# Patient Record
Sex: FEMALE | Race: WHITE | Employment: UNEMPLOYED
[De-identification: names, ages, dates, MRNs, and addresses within clinical notes are randomized per-mention and may not be internally consistent; named-entity substitution may affect disease eponyms.]

---

## 2017-11-12 ENCOUNTER — HEALTH MAINTENANCE LETTER (OUTPATIENT)
Age: 27
End: 2017-11-12

## 2022-01-01 NOTE — PROGRESS NOTES
"History of Present Illness - Abby Harrison is a 31 year old female here to see me for the first time as a patient, but I have seen her previously with her daughter who is a patient of mine.      She presents due to a chronically inflamed nodule of the ear lobe.  She thinks that it started after wearing a new pair of earrings that casued a lot of irritation.  And even after no longer using those earrings, a chronically irritated and sometimes draining nodule has formed in the ear lobe.    Past medical history -   Patient Active Problem List   Diagnosis     CARDIOVASCULAR SCREENING; LDL GOAL LESS THAN 160       /72   Pulse 82   Ht 1.727 m (5' 8\")   Wt 105.1 kg (231 lb 9.6 oz)   SpO2 99%   BMI 35.21 kg/m      General - The patient is well nourished and well developed, and appears to have good nutritional status.  Alert and oriented to person and place, answers questions and cooperates with examination appropriately.   Head and Face - Normocephalic and atraumatic, with no gross asymmetry noted of the contour of the facial features.  The facial nerve is intact, with strong symmetric movements.  Eyes - Extraocular movements intact, and the pupils were reactive to light.  Sclera were not icteric or injected, conjunctiva were pink and moist.  Mouth - Examination of the oral cavity shows pink, healthy, moist mucosa.  No lesions or ulceration noted.  The dentition are in good repair.  The tongue is mobile and midline.  Ear - The left external ear lobe had a very firm subdermal nodule in the middle of the lobule.  It seemed consistent with a cyst, but it was large and followed the tract of the piercing, such that it was full thickness of the lobule, front to back.    PROCEDURE  After informed consent, I injected the area around the skin lesion with 1cc of 1% Lidocaine with 1:100,000 epinephrine.  After sufficient local anesthesia was confirmed, I used an 11 blade to take a wedge cut through the lobule, and removed the " entire cutaneous and subcutaneous nodule with the wedge of tissue.  Total length 1.8 cm.      I then placed a single 4-0 Vicryl in the subdermal fibrofatty tissue to re approximate the circular wound. I then used 4-0 fast gut to re approximate the skin on the front and back of the lobule.    A/P - Abby Harrison  (H61.92) Skin lesion of left ear  (primary encounter diagnosis)    The patient was educated on wound care and signs of infection.  Follow-up in one month for a wound check.  We might need to revise a small dog ear at the edge of the lobule.

## 2022-01-06 ENCOUNTER — OFFICE VISIT (OUTPATIENT)
Dept: OTOLARYNGOLOGY | Facility: CLINIC | Age: 32
End: 2022-01-06
Payer: COMMERCIAL

## 2022-01-06 VITALS
HEART RATE: 82 BPM | OXYGEN SATURATION: 99 % | HEIGHT: 68 IN | BODY MASS INDEX: 35.1 KG/M2 | DIASTOLIC BLOOD PRESSURE: 72 MMHG | SYSTOLIC BLOOD PRESSURE: 106 MMHG | WEIGHT: 231.6 LBS

## 2022-01-06 DIAGNOSIS — H61.92 SKIN LESION OF LEFT EAR: Primary | ICD-10-CM

## 2022-01-06 PROCEDURE — 11442 EXC FACE-MM B9+MARG 1.1-2 CM: CPT | Performed by: OTOLARYNGOLOGY

## 2022-01-06 PROCEDURE — 99203 OFFICE O/P NEW LOW 30 MIN: CPT | Mod: 25 | Performed by: OTOLARYNGOLOGY

## 2022-01-06 ASSESSMENT — MIFFLIN-ST. JEOR: SCORE: 1809.03

## 2022-01-06 NOTE — LETTER
"    1/6/2022         RE: Abby Harrison  [address]  [facility] [address]        Dear Colleague,    Thank you for referring your patient, Abby Harrison, to the [facility]. Please see a copy of my visit note below.    History of Present Illness - Abby Harrison is a 31 year old female here to see me for the first time as a patient, but I have seen her previously with her daughter who is a patient of mine.      She presents due to a chronically inflamed nodule of the ear lobe.  She thinks that it started after wearing a new pair of earrings that casued a lot of irritation.  And even after no longer using those earrings, a chronically irritated and sometimes draining nodule has formed in the ear lobe.    Past medical history -   Patient Active Problem List   Diagnosis     CARDIOVASCULAR SCREENING; LDL GOAL LESS THAN 160       /72   Pulse 82   Ht 1.727 m (5' 8\")   Wt 105.1 kg (231 lb 9.6 oz)   SpO2 99%   BMI 35.21 kg/m      General - The patient is well nourished and well developed, and appears to have good nutritional status.  Alert and oriented to person and place, answers questions and cooperates with examination appropriately.   Head and Face - Normocephalic and atraumatic, with no gross asymmetry noted of the contour of the facial features.  The facial nerve is intact, with strong symmetric movements.  Eyes - Extraocular movements intact, and the pupils were reactive to light.  Sclera were not icteric or injected, conjunctiva were pink and moist.  Mouth - Examination of the oral cavity shows pink, healthy, moist mucosa.  No lesions or ulceration noted.  The dentition are in good repair.  The tongue is mobile and midline.  Ear - The left external ear lobe had a very firm subdermal nodule in the middle of the lobule.  It seemed consistent with a cyst, but it was large and followed the tract of the piercing, such that it was full thickness of the lobule, front to " back.    PROCEDURE  After informed consent, I injected the area around the skin lesion with 1cc of 1% Lidocaine with 1:100,000 epinephrine.  After sufficient local anesthesia was confirmed, I used an 11 blade to take a wedge cut through the lobule, and removed the entire cutaneous and subcutaneous nodule with the wedge of tissue.  Total length 1.8 cm.      I then placed a single 4-0 Vicryl in the subdermal fibrofatty tissue to re approximate the circular wound. I then used 4-0 fast gut to re approximate the skin on the front and back of the lobule.    A/P - Florad Christy  (H61.92) Skin lesion of left ear  (primary encounter diagnosis)    The patient was educated on wound care and signs of infection.  Follow-up in one month for a wound check.  We might need to revise a small dog ear at the edge of the lobule.        Again, thank you for allowing me to participate in the care of your patient.        Sincerely,        Eliud Mukherjee MD

## 2023-09-15 ENCOUNTER — APPOINTMENT (OUTPATIENT)
Dept: URBAN - METROPOLITAN AREA CLINIC 252 | Age: 33
Setting detail: DERMATOLOGY
End: 2023-09-18

## 2023-09-15 DIAGNOSIS — Z41.9 ENCOUNTER FOR PROCEDURE FOR PURPOSES OTHER THAN REMEDYING HEALTH STATE, UNSPECIFIED: ICD-10-CM

## 2023-09-15 PROCEDURE — OTHER BOTOX: OTHER

## 2023-09-15 NOTE — PROCEDURE: BOTOX
Price (Use Numbers Only, No Special Characters Or $): 808 Price (Use Numbers Only, No Special Characters Or $): 447